# Patient Record
Sex: FEMALE | Race: WHITE | URBAN - METROPOLITAN AREA
[De-identification: names, ages, dates, MRNs, and addresses within clinical notes are randomized per-mention and may not be internally consistent; named-entity substitution may affect disease eponyms.]

---

## 2017-01-02 ENCOUNTER — OFFICE VISIT (OUTPATIENT)
Dept: FAMILY MEDICINE | Facility: CLINIC | Age: 37
End: 2017-01-02
Payer: COMMERCIAL

## 2017-01-02 VITALS
WEIGHT: 191 LBS | HEART RATE: 77 BPM | DIASTOLIC BLOOD PRESSURE: 70 MMHG | TEMPERATURE: 97.5 F | SYSTOLIC BLOOD PRESSURE: 131 MMHG

## 2017-01-02 DIAGNOSIS — S86.301A PERONEAL TENDON INJURY, RIGHT, INITIAL ENCOUNTER: Primary | ICD-10-CM

## 2017-01-02 PROCEDURE — 99213 OFFICE O/P EST LOW 20 MIN: CPT | Performed by: FAMILY MEDICINE

## 2017-01-02 RX ORDER — ACETAMINOPHEN 160 MG
1 TABLET,DISINTEGRATING ORAL
COMMUNITY

## 2017-01-02 NOTE — Clinical Note
Please abstract the following data from this visit with this patient into the appropriate field in Epic:  Pap smear done on this date: 4/2014 (approximately), by this group: [facility], results were normal.

## 2017-01-02 NOTE — NURSING NOTE
Chief Complaint   Patient presents with     Fall     right ankle DOI 1/1/17       Initial /70 mmHg  Pulse 77  Temp(Src) 97.5  F (36.4  C) (Oral)  Wt 191 lb (86.637 kg) There is no height on file to calculate BMI.  BP completed using cuff size: penny Douglass M.A.

## 2017-01-02 NOTE — MR AVS SNAPSHOT
After Visit Summary   1/2/2017    Lora Joaquin    MRN: [id number]           Patient Information     Date Of Birth          1980        Visit Information        Provider Department      1/2/2017 10:45 AM Santi Montelongo MD [facility]        Today's Diagnoses     Peroneal tendon injury, right, initial encounter    -  1       Care Instructions    I recommended that the patient ice the ankle with a gel pack wrapped around the ankle with a Ace wrap 15 minutes on and 15 minutes off as much as possible for the next 72 hours.  The patient and her family and I discussed the absolute importance of range of motion exercises and strengthening exercises despite the immediate discomfort that they may cause. I demonstrated the Jena exercises and the alphabet exercises and had the patient try this. We discussed a rehabilitation regimen that the patient should start after the above mentioned exercises are not painful. The first phase is straight ahead walking on level ground. When walking is not painful then the patient can start running. When running is not painful the patient can start doing some figure 8 drills and progress to shuttle run. After this is accomplished the patient can return to full activity.          Follow-ups after your visit        Follow-up notes from your care team     Return in about 2 weeks (around 1/16/2017), or if symptoms worsen or fail to improve.      Who to contact     If you have questions or need follow up information about today's clinic visit or your schedule please contact [facility] directly at [phone].  Normal or non-critical lab and imaging results will be communicated to you by MyChart, letter or phone within 4 business days after the clinic has received the results. If you do not hear from us within 7 days, please contact the clinic through MyChart or phone. If you have a critical or abnormal lab result, we will notify you by phone  as soon as possible.  Submit refill requests through Geekatoo or call your pharmacy and they will forward the refill request to us. Please allow 3 business days for your refill to be completed.          Additional Information About Your Visit        SaygusharGenwords Information     Geekatoo gives you secure access to your electronic health record. If you see a primary care provider, you can also send messages to your care team and make appointments. If you have questions, please call your primary care clinic.  If you do not have a primary care provider, please call [phone] and they will assist you.        Your Vitals Were     Pulse Temperature                77 97.5  F (36.4  C) (Oral)           Blood Pressure from Last 3 Encounters:   01/02/17 131/70   01/27/13 144/96    Weight from Last 3 Encounters:   01/02/17 191 lb (86.637 kg)   01/27/13 167 lb (75.751 kg)              Today, you had the following     No orders found for display       Primary Care Provider    Md Other Clinic                Thank you!     [facility]. Our goal is always to provide you with excellent care. Hearing back from our patients is one way we can continue to improve our services. Please take a few minutes to complete the written survey that you may receive in the mail after your visit with us. Thank you!             Your Updated Medication List - Protect others around you: Learn how to safely use, store and throw away your medicines at www.disposemymeds.org.          This list is accurate as of: 1/2/17 11:14 AM.  Always use your most recent med list.                   Brand Name Dispense Instructions for use    PROBIOTIC DAILY PO      Take 2 capsules by mouth daily       vitamin D3 2000 UNITS Caps      Take 1 capsule by mouth

## 2017-01-02 NOTE — PATIENT INSTRUCTIONS
I recommended that the patient ice the ankle with a gel pack wrapped around the ankle with a Ace wrap 15 minutes on and 15 minutes off as much as possible for the next 72 hours.  The patient and her family and I discussed the absolute importance of range of motion exercises and strengthening exercises despite the immediate discomfort that they may cause. I demonstrated the Te-Moak exercises and the alphabet exercises and had the patient try this. We discussed a rehabilitation regimen that the patient should start after the above mentioned exercises are not painful. The first phase is straight ahead walking on level ground. When walking is not painful then the patient can start running. When running is not painful the patient can start doing some figure 8 drills and progress to shuttle run. After this is accomplished the patient can return to full activity.

## 2017-01-02 NOTE — PROGRESS NOTES
SUBJECTIVE:                                                    Lora Joaquin is a 36 year old female who presents to clinic today for the following health issues:      Fall twisted right ankle DOI 1/1/17    Problem list and histories reviewed & adjusted, as indicated.  --------------------------------------------------------------------------------------------------------------------------------------    [place] Criteria:  An ankle radiograph series is only required if there is any pain in the malleolar zone and any of these findings:  1. Bony tenderness at the posterior edge (6cm) or tip of the lateral malleolus.  2. Bony tenderness at the posterior edge (6cm) or tip of the medial malleolus.  3. Inability to bear weight both immediately and in the emergency department.    A foot radiograph series is only required if there is any pain in the midfoot zone and any of these findings:  1. Bony tenderness at the base of the 5th metatarsal  2. Bony tenderness at the navicular.  3. Inability to bear weight both immediately and in the emergency department.    (Does not apply if the patient is under 18, intoxicated or a history of multiple injuries)        SUBJECTIVE:  Lora Joaquin is a 36 year old female who is seen as self referral for  right ankle injury that occurred  1 days ago. The onset of the pain was sudden  The pain started after an injury in which the patient walking down the stairs and tripped over her 4 year old son.   The patient reports that she was able to bear weight at the time of the injury.    The patient reports that these symptoms have been gradually worsening since that time.  Provacative factors include: inverting the ankle  Palliative factors for the pain include: rest  The patient relates no prior history of problems in this ankle.  The patient reports that she is employed in [employer] and does not have a physical job that demands use of the affected ankle.  The patient reports that she  does not play any sports.  The patients past medical, surgical, social and family histories were reviewed.  Social History     Social History     Marital Status:      Spouse Name: N/A     Number of Children: N/A     Years of Education: N/A     Social History Main Topics     Smoking status: Never Smoker      Smokeless tobacco: Never Used     Alcohol Use: Yes     Drug Use: No     Sexual Activity:     Partners: Male     Other Topics Concern     None     Social History Narrative         REVIEW OF SYSTEMS:  CONSTITUTIONAL:NEGATIVE for fever, chills, change in weight      OBJECTIVE:  /70 mmHg  Pulse 77  Temp(Src) 97.5  F (36.4  C) (Oral)  Wt 191 lb (86.637 kg)  GENERAL APPEARANCE: healthy, alert and no distress  SKIN: no suspicious lesions or rashes  PSYCH:  mentation appears normal and affect normal/bright  Ankle Exam (right):  Inspection:no swelling seen  Palpation:non-tender throughout except the peroneal tendons just proximal to the malleolus  Good doralis pedis and posterior tibial pulses  Neurovascularly Intact Distally.   Special Tests:  Positive: Pain with passive inversion  Negative: Anterior drawer sign   Peroneal subluxation seen with resistance to inversion  Pain with passive eversion  Pain in achilles with passive flexion    X-Ray:   not indicated  offical reading pending  normal mortise, no loose bodies, no fractures seen    ASSESSMENT:    Peroneal tendon strain    PLAN:  I recommended that the patient ice the ankle with a gel pack wrapped around the ankle with a Ace wrap 15 minutes on and 15 minutes off as much as possible for the next 72 hours. I encouraged elevation while there is swelling. The patient and her family and I discussed the absolute importance of range of motion exercises and strengthening exercises despite the immediate discomfort that they may cause. I demonstrated the Tangirnaq exercises and the alphabet exercises and had the patient try this.   Ankle brace was given  We  discussed a rehabilitation regimen that the patient should start after the above mentioned exercises are not painful. The first phase is straight ahead walking on level ground. When walking is not painful then the patient can start running. When running is not painful the patient can start doing some figure 8 drills and progress to shuttle run. After this is accomplished the patient can return to practice.    The patient is not an athlete so the patient was fitted with aircast splint.    Follow up by phone in 2 months if not improving

## 2017-11-19 ENCOUNTER — HEALTH MAINTENANCE LETTER (OUTPATIENT)
Age: 37
End: 2017-11-19

## 2018-10-05 ENCOUNTER — ALLIED HEALTH/NURSE VISIT (OUTPATIENT)
Dept: NURSING | Facility: CLINIC | Age: 38
End: 2018-10-05
Payer: COMMERCIAL

## 2018-10-05 DIAGNOSIS — Z23 NEED FOR PROPHYLACTIC VACCINATION AND INOCULATION AGAINST INFLUENZA: Primary | ICD-10-CM

## 2018-10-05 PROCEDURE — 90471 IMMUNIZATION ADMIN: CPT

## 2018-10-05 PROCEDURE — 90686 IIV4 VACC NO PRSV 0.5 ML IM: CPT

## 2018-10-05 PROCEDURE — 99207 ZZC NO CHARGE NURSE ONLY: CPT

## 2018-10-05 NOTE — MR AVS SNAPSHOT
After Visit Summary   10/5/2018    Lora Joaquin    MRN: [id number]           Patient Information     Date Of Birth          1980        Visit Information        Provider Department      10/5/2018 7:40 AM [facility]        Today's Diagnoses     Need for prophylactic vaccination and inoculation against influenza    -  1       Follow-ups after your visit        Who to contact     If you have questions or need follow up information about today's clinic visit or your schedule please contact [facility] directly at [phone].  Normal or non-critical lab and imaging results will be communicated to you by Flywheelhart, letter or phone within 4 business days after the clinic has received the results. If you do not hear from us within 7 days, please contact the clinic through FilterSuret or phone. If you have a critical or abnormal lab result, we will notify you by phone as soon as possible.  Submit refill requests through Teachernow or call your pharmacy and they will forward the refill request to us. Please allow 3 business days for your refill to be completed.          Additional Information About Your Visit        MyChart Information     Teachernow gives you secure access to your electronic health record. If you see a primary care provider, you can also send messages to your care team and make appointments. If you have questions, please call your primary care clinic.  If you do not have a primary care provider, please call [phone] and they will assist you.        Care EveryWhere ID     This is your Care EveryWhere ID. This could be used by other organizations to access your [facility] medical records  [id number]         Blood Pressure from Last 3 Encounters:   01/02/17 131/70   01/27/13 (!) 144/96    Weight from Last 3 Encounters:   01/02/17 191 lb (86.6 kg)   01/27/13 167 lb (75.8 kg)              We Performed the Following     FLU VACCINE, SPLIT VIRUS,  IM (QUADRIVALENT) [24013]- >3 YRS     Vaccine Administration, Initial [27256]        Primary Care Provider Office Phone # Fax #    [facility] [phone] [address] [place] [address]        Equal Access to Services     [facility] SRINIVASAN : Hadii aad ku hadasho Soomaali, waaxda luqadaha, qaybta kaalmada adeegyada, waxay idiin hayaan adeeg kharash la'spensern ah. So [facility] [phone].    ATENCIÓN: Si habla español, tiene a viramontes disposición servicios gratuitos de asistencia lingüística. [place] al [phone].    We comply with applicable federal civil rights laws and [place] laws. We do not discriminate on the basis of race, color, national origin, age, disability, sex, sexual orientation, or gender identity.            Thank you!     Thank you for choosing [facility]  for your care. Our goal is always to provide you with excellent care. Hearing back from our patients is one way we can continue to improve our services. Please take a few minutes to complete the written survey that you may receive in the mail after your visit with us. Thank you!             Your Updated Medication List - Protect others around you: Learn how to safely use, store and throw away your medicines at www.disposemymeds.org.          This list is accurate as of 10/5/18  9:35 AM.  Always use your most recent med list.                   Brand Name Dispense Instructions for use Diagnosis    PROBIOTIC DAILY PO      Take 2 capsules by mouth daily        vitamin D3 2000 units Caps      Take 1 capsule by mouth

## 2020-03-02 ENCOUNTER — HEALTH MAINTENANCE LETTER (OUTPATIENT)
Age: 40
End: 2020-03-02

## 2020-12-14 ENCOUNTER — HEALTH MAINTENANCE LETTER (OUTPATIENT)
Age: 40
End: 2020-12-14

## 2021-04-18 ENCOUNTER — HEALTH MAINTENANCE LETTER (OUTPATIENT)
Age: 41
End: 2021-04-18

## 2021-10-02 ENCOUNTER — HEALTH MAINTENANCE LETTER (OUTPATIENT)
Age: 41
End: 2021-10-02

## 2022-05-14 ENCOUNTER — HEALTH MAINTENANCE LETTER (OUTPATIENT)
Age: 42
End: 2022-05-14

## 2022-09-03 ENCOUNTER — HEALTH MAINTENANCE LETTER (OUTPATIENT)
Age: 42
End: 2022-09-03

## 2022-12-08 ENCOUNTER — LAB REQUISITION (OUTPATIENT)
Dept: LAB | Facility: CLINIC | Age: 42
End: 2022-12-08
Payer: COMMERCIAL

## 2022-12-08 DIAGNOSIS — Z01.411 ENCOUNTER FOR GYNECOLOGICAL EXAMINATION (GENERAL) (ROUTINE) WITH ABNORMAL FINDINGS: ICD-10-CM

## 2022-12-08 DIAGNOSIS — Z13.220 ENCOUNTER FOR SCREENING FOR LIPOID DISORDERS: ICD-10-CM

## 2022-12-08 DIAGNOSIS — N93.9 ABNORMAL UTERINE AND VAGINAL BLEEDING, UNSPECIFIED: ICD-10-CM

## 2022-12-08 LAB
CHOLEST SERPL-MCNC: 206 MG/DL
ERYTHROCYTE [DISTWIDTH] IN BLOOD BY AUTOMATED COUNT: 12.9 % (ref 10–15)
HBA1C MFR BLD: 5.9 %
HCT VFR BLD AUTO: 41.6 % (ref 35–47)
HDLC SERPL-MCNC: 56 MG/DL
HGB BLD-MCNC: 13.3 G/DL (ref 11.7–15.7)
LDLC SERPL CALC-MCNC: 128 MG/DL
MCH RBC QN AUTO: 29.4 PG (ref 26.5–33)
MCHC RBC AUTO-ENTMCNC: 32 G/DL (ref 31.5–36.5)
MCV RBC AUTO: 92 FL (ref 78–100)
NONHDLC SERPL-MCNC: 150 MG/DL
PLATELET # BLD AUTO: 347 10E3/UL (ref 150–450)
RBC # BLD AUTO: 4.52 10E6/UL (ref 3.8–5.2)
TRIGL SERPL-MCNC: 109 MG/DL
WBC # BLD AUTO: 8.9 10E3/UL (ref 4–11)

## 2022-12-08 PROCEDURE — 80061 LIPID PANEL: CPT | Mod: ORL | Performed by: OBSTETRICS & GYNECOLOGY

## 2022-12-08 PROCEDURE — 83036 HEMOGLOBIN GLYCOSYLATED A1C: CPT | Mod: ORL | Performed by: OBSTETRICS & GYNECOLOGY

## 2022-12-08 PROCEDURE — 85027 COMPLETE CBC AUTOMATED: CPT | Mod: ORL | Performed by: OBSTETRICS & GYNECOLOGY

## 2022-12-08 PROCEDURE — 87624 HPV HI-RISK TYP POOLED RSLT: CPT | Mod: ORL | Performed by: OBSTETRICS & GYNECOLOGY

## 2022-12-08 PROCEDURE — G0145 SCR C/V CYTO,THINLAYER,RESCR: HCPCS | Mod: ORL | Performed by: OBSTETRICS & GYNECOLOGY

## 2022-12-12 LAB
BKR LAB AP GYN ADEQUACY: NORMAL
BKR LAB AP GYN INTERPRETATION: NORMAL
BKR LAB AP HPV REFLEX: NORMAL
BKR LAB AP LMP: NORMAL
BKR LAB AP PREVIOUS ABNL DX: NORMAL
BKR LAB AP PREVIOUS ABNORMAL: NORMAL
PATH REPORT.COMMENTS IMP SPEC: NORMAL
PATH REPORT.COMMENTS IMP SPEC: NORMAL
PATH REPORT.RELEVANT HX SPEC: NORMAL

## 2022-12-14 LAB
HUMAN PAPILLOMA VIRUS 16 DNA: NEGATIVE
HUMAN PAPILLOMA VIRUS 18 DNA: NEGATIVE
HUMAN PAPILLOMA VIRUS FINAL DIAGNOSIS: NORMAL
HUMAN PAPILLOMA VIRUS OTHER HR: NEGATIVE

## 2023-06-03 ENCOUNTER — HEALTH MAINTENANCE LETTER (OUTPATIENT)
Age: 43
End: 2023-06-03

## 2024-02-17 ENCOUNTER — HEALTH MAINTENANCE LETTER (OUTPATIENT)
Age: 44
End: 2024-02-17